# Patient Record
Sex: MALE | URBAN - METROPOLITAN AREA
[De-identification: names, ages, dates, MRNs, and addresses within clinical notes are randomized per-mention and may not be internally consistent; named-entity substitution may affect disease eponyms.]

---

## 2024-10-23 ENCOUNTER — CLAIMS CREATED FROM THE CLAIM WINDOW (OUTPATIENT)
Dept: URBAN - METROPOLITAN AREA MEDICAL CENTER 12 | Facility: MEDICAL CENTER | Age: 54
End: 2024-10-23
Payer: MEDICAID

## 2024-10-23 DIAGNOSIS — Z87.19 PERSONAL HISTORY OF OTHER DISEASES OF THE DIGESTIVE SYSTEM: ICD-10-CM

## 2024-10-23 DIAGNOSIS — K92.1 HEMATOCHEZIA: ICD-10-CM

## 2024-10-23 PROCEDURE — 99254 IP/OBS CNSLTJ NEW/EST MOD 60: CPT | Performed by: STUDENT IN AN ORGANIZED HEALTH CARE EDUCATION/TRAINING PROGRAM

## 2024-10-23 PROCEDURE — 99204 OFFICE O/P NEW MOD 45 MIN: CPT | Performed by: STUDENT IN AN ORGANIZED HEALTH CARE EDUCATION/TRAINING PROGRAM

## 2024-10-24 ENCOUNTER — CLAIMS CREATED FROM THE CLAIM WINDOW (OUTPATIENT)
Dept: URBAN - METROPOLITAN AREA MEDICAL CENTER 12 | Facility: MEDICAL CENTER | Age: 54
End: 2024-10-24
Payer: MEDICAID

## 2024-10-24 DIAGNOSIS — K62.6 RECTAL ULCER: ICD-10-CM

## 2024-10-24 DIAGNOSIS — K92.1 HEMATOCHEZIA: ICD-10-CM

## 2024-10-24 PROCEDURE — 45380 COLONOSCOPY AND BIOPSY: CPT | Performed by: INTERNAL MEDICINE

## 2024-11-19 ENCOUNTER — DASHBOARD ENCOUNTERS (OUTPATIENT)
Age: 54
End: 2024-11-19

## 2024-11-25 ENCOUNTER — OFFICE VISIT (OUTPATIENT)
Dept: URBAN - METROPOLITAN AREA CLINIC 92 | Facility: CLINIC | Age: 54
End: 2024-11-25

## 2024-11-25 NOTE — HPI-TODAY'S VISIT:
Erich Desai is a 54 y.o. 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  GI consult requested.  Patient had been reporting intermittent hematochezia over the past ~6 months. However, for the past 1 month all stools have been bloody. He has to manually stimulate his rectum in order to defecate with last BM on Friday, 10/18. He describes bright red blood that fully saturates the commode water. He does not feel anything from mid abdomen down. He denies any rectal pain or abdominal pain. He complains of weight gain. He denies any melena, chest pain, SOB, fever/chills, nausea, vomiting, change in appetite. Patient is prescribed Xarelto, but has not taken in ~6 months given bleeding.  Patient has been seen outpatient by his PCP, and recommended to have colonoscopy performed given hematochezia, however, patient did not have someone to go with him so had not been done. He has never had a colonoscopy. He takes oral iron supplementation.  History of iron deficiency anemia. Hgb 11.1 to 10.9 to 10.7.  Colonoscopy 10/24/24: Preparation of the colon was fair. Small/medium polyps could have been missed, Non-thrombosed internal hemorrhoids found on perianal exam, A single (solitary) ulcer in the rectum.The examined portion of the ileum was normal

## 2024-12-02 ENCOUNTER — OFFICE VISIT (OUTPATIENT)
Dept: URBAN - METROPOLITAN AREA CLINIC 92 | Facility: CLINIC | Age: 54
End: 2024-12-02

## 2024-12-02 NOTE — HPI-TODAY'S VISIT:
54-year-old male patient presents today for hospital follow-up.  He has a past medical history of HTN, neuropathy, spinal cord injury in 2017 with resultant paraplegia, neurogenic bladder, DVT/PEs on Xarelto, chronic osteomyelitis, recurrent/chronic right ischial pressure wound, JUDI.  He presented to [facility] on 10- due to hematochezia.  Noted he was having this for the past 6 months however past month all stool had been bloody.  He has prescribed Xarelto but has not taken this for about 6 months given the bleeding.  He had never had a colonoscopy at that time.  Colonoscopy 10- demonstrated nonthrombosed internal hemorrhoids, solitary rectal ulcer biopsy showed fibropurulent exudate consistent with ulcer no dysplasia or carcinoma prep was fair.  Labs demonstrated hemoglobin 10.9, hct 36.4.  He was stabilized and told to follow-up with GI outpatient.

## 2024-12-06 ENCOUNTER — OFFICE VISIT (OUTPATIENT)
Dept: URBAN - METROPOLITAN AREA CLINIC 92 | Facility: CLINIC | Age: 54
End: 2024-12-06
Payer: COMMERCIAL

## 2024-12-06 VITALS
HEIGHT: 76 IN | TEMPERATURE: 90.1 F | HEART RATE: 75 BPM | SYSTOLIC BLOOD PRESSURE: 143 MMHG | WEIGHT: 175.6 LBS | DIASTOLIC BLOOD PRESSURE: 82 MMHG | BODY MASS INDEX: 21.38 KG/M2

## 2024-12-06 DIAGNOSIS — K64.8 INTERNAL HEMORRHOIDS: ICD-10-CM

## 2024-12-06 DIAGNOSIS — K62.6 RECTAL ULCER: ICD-10-CM

## 2024-12-06 DIAGNOSIS — K59.09 CHRONIC CONSTIPATION: ICD-10-CM

## 2024-12-06 PROCEDURE — 99204 OFFICE O/P NEW MOD 45 MIN: CPT

## 2024-12-06 RX ORDER — RIVAROXABAN 10 MG/1
1 TABLET WITH FOOD TABLET, FILM COATED ORAL ONCE A DAY
Status: ACTIVE | COMMUNITY

## 2024-12-06 RX ORDER — CYCLOBENZAPRINE HYDROCHLORIDE 5 MG/1
1 TABLET AT BEDTIME AS NEEDED TABLET, FILM COATED ORAL ONCE A DAY
Status: ACTIVE | COMMUNITY

## 2024-12-06 RX ORDER — OLMESARTAN MEDOXOMIL 5 MG/1
AS DIRECTED TABLET, FILM COATED ORAL
Status: ACTIVE | COMMUNITY

## 2024-12-06 RX ORDER — HYDROCORTISONE ACETATE 25 MG/1
1 SUPPOSITORY SUPPOSITORY RECTAL ONCE A DAY
Qty: 14 | Refills: 3 | OUTPATIENT
Start: 2024-12-06 | End: 2025-01-31

## 2024-12-06 RX ORDER — FOLIC ACID 1 MG/1
1 TABLET TABLET ORAL ONCE A DAY
Status: ACTIVE | COMMUNITY

## 2024-12-06 RX ORDER — GABAPENTIN 300 MG/1
1 CAPSULE CAPSULE ORAL ONCE A DAY
Status: ACTIVE | COMMUNITY

## 2024-12-06 RX ORDER — AMLODIPINE BESYLATE 5 MG/1
1 TABLET TABLET ORAL ONCE A DAY
Status: ACTIVE | COMMUNITY

## 2024-12-06 NOTE — PHYSICAL EXAM CONSTITUTIONAL:
normal,  alert,  in no acute distress,  well developed, well nourished, normal communication ability, , confined to a wheelchair

## 2024-12-06 NOTE — HPI-TODAY'S VISIT:
54-year-old male patient presents today for hospital follow-up.  He has a past medical history of HTN, neuropathy, spinal cord injury in 2017 with resultant paraplegia, neurogenic bladder, DVT/PEs on Xarelto, chronic osteomyelitis, recurrent/chronic right ischial pressure wound, JUDI.  He presented to [facility] [place] on 10- due to hematochezia.  Noted he was having this for the past 6 months however past month all stool had been bloody.  He has prescribed Xarelto but has not taken this for about 6 months given the bleeding.  He had never had a colonoscopy at that time.  Colonoscopy 10- demonstrated nonthrombosed internal hemorrhoids, solitary rectal ulcer biopsy showed fibropurulent exudate consistent with ulcer no dysplasia or carcinoma prep was fair.  Labs demonstrated hemoglobin 10.9, hct 36.4.  He was stabilized and told to follow-up with GI outpatient.  Today he notes he has not been looking at his stool to see if he still has blood. He has BM every few days. He does not have rectal sensation but has rlq discomfort when he needs to have a BM. He is back on his Xarelto for blood clots. He bought fiber supplements and has used this for a few days. He has no used suppositories for his IH. He did buy a pillow to help with his IH. He has no upper gi issues. No fam h/o gi cancers or conditions.

## 2025-01-17 ENCOUNTER — OFFICE VISIT (OUTPATIENT)
Dept: URBAN - METROPOLITAN AREA CLINIC 92 | Facility: CLINIC | Age: 55
End: 2025-01-17
Payer: COMMERCIAL

## 2025-01-17 VITALS
WEIGHT: 175 LBS | BODY MASS INDEX: 21.31 KG/M2 | HEIGHT: 76 IN | SYSTOLIC BLOOD PRESSURE: 126 MMHG | TEMPERATURE: 96.4 F | DIASTOLIC BLOOD PRESSURE: 68 MMHG | HEART RATE: 98 BPM

## 2025-01-17 DIAGNOSIS — K62.6 RECTAL ULCER: ICD-10-CM

## 2025-01-17 DIAGNOSIS — K64.8 INTERNAL HEMORRHOIDS: ICD-10-CM

## 2025-01-17 DIAGNOSIS — K59.09 CHRONIC CONSTIPATION: ICD-10-CM

## 2025-01-17 PROCEDURE — 99213 OFFICE O/P EST LOW 20 MIN: CPT

## 2025-01-17 RX ORDER — HYDROCORTISONE ACETATE 25 MG/1
1 SUPPOSITORY SUPPOSITORY RECTAL ONCE A DAY
Qty: 14 | Refills: 3 | Status: ACTIVE | COMMUNITY
Start: 2024-12-06 | End: 2025-01-31

## 2025-01-17 RX ORDER — CYCLOBENZAPRINE HYDROCHLORIDE 5 MG/1
1 TABLET AT BEDTIME AS NEEDED TABLET, FILM COATED ORAL ONCE A DAY
Status: ACTIVE | COMMUNITY

## 2025-01-17 RX ORDER — AMLODIPINE BESYLATE 5 MG/1
1 TABLET TABLET ORAL ONCE A DAY
Status: ACTIVE | COMMUNITY

## 2025-01-17 RX ORDER — GABAPENTIN 300 MG/1
1 CAPSULE CAPSULE ORAL ONCE A DAY
Status: ACTIVE | COMMUNITY

## 2025-01-17 RX ORDER — RIVAROXABAN 10 MG/1
1 TABLET WITH FOOD TABLET, FILM COATED ORAL ONCE A DAY
Status: ACTIVE | COMMUNITY

## 2025-01-17 RX ORDER — FOLIC ACID 1 MG/1
1 TABLET TABLET ORAL ONCE A DAY
Status: ACTIVE | COMMUNITY

## 2025-01-17 RX ORDER — OLMESARTAN MEDOXOMIL 5 MG/1
AS DIRECTED TABLET, FILM COATED ORAL
Status: ACTIVE | COMMUNITY

## 2025-01-17 NOTE — HPI-TODAY'S VISIT:
12/6/24 54-year-old male patient presents today for hospital follow-up.  He has a past medical history of HTN, neuropathy, spinal cord injury in 2017 with resultant paraplegia, neurogenic bladder, DVT/PEs on Xarelto, chronic osteomyelitis, recurrent/chronic right ischial pressure wound, JUDI.  He presented to [facility] on 10- due to hematochezia.  Noted he was having this for the past 6 months however past month all stool had been bloody.  He has prescribed Xarelto but has not taken this for about 6 months given the bleeding.  He had never had a colonoscopy at that time.  Colonoscopy 10- demonstrated nonthrombosed internal hemorrhoids, solitary rectal ulcer biopsy showed fibropurulent exudate consistent with ulcer no dysplasia or carcinoma prep was fair.  Labs demonstrated hemoglobin 10.9, hct 36.4.  He was stabilized and told to follow-up with GI outpatient.  Today he notes he has not been looking at his stool to see if he still has blood. He has BM every few days. He does not have rectal sensation but has rlq discomfort when he needs to have a BM. He is back on his Xarelto for blood clots. He bought fiber supplements and has used this for a few days. He has no used suppositories for his IH. He did buy a pillow to help with his IH. He has no upper gi issues. No fam h/o gi cancers or conditions.  1/17/25 He has been doing fiber daily and this helps his BM. He picked up prep h cream not suppositories. Still has rectal bleeding daily. No labs since oct 2024

## 2025-01-18 LAB
ABSOLUTE BASOPHILS: 52
ABSOLUTE EOSINOPHILS: 124
ABSOLUTE LYMPHOCYTES: 1566
ABSOLUTE MONOCYTES: 855
ABSOLUTE NEUTROPHILS: 7704
BASOPHILS: 0.5
EOSINOPHILS: 1.2
HEMATOCRIT: 34.2
HEMOGLOBIN: 10.8
LYMPHOCYTES: 15.2
MCH: 24.9
MCHC: 31.6
MCV: 78.8
MONOCYTES: 8.3
MPV: 9.3
NEUTROPHILS: 74.8
PLATELET COUNT: 614
RDW: 13.5
RED BLOOD CELL COUNT: 4.34
WHITE BLOOD CELL COUNT: 10.3

## 2025-04-03 ENCOUNTER — CLAIMS CREATED FROM THE CLAIM WINDOW (OUTPATIENT)
Dept: URBAN - METROPOLITAN AREA MEDICAL CENTER 12 | Facility: MEDICAL CENTER | Age: 55
End: 2025-04-03
Payer: COMMERCIAL

## 2025-04-03 DIAGNOSIS — K59.09 OTHER CONSTIPATION: ICD-10-CM

## 2025-04-03 DIAGNOSIS — K92.1 HEMATOCHEZIA: ICD-10-CM

## 2025-04-03 DIAGNOSIS — Z87.19 PERSONAL HISTORY OF OTHER DISEASES OF THE DIGESTIVE SYSTEM: ICD-10-CM

## 2025-04-03 DIAGNOSIS — D64.89 ANEMIA DUE TO OTHER CAUSE: ICD-10-CM

## 2025-04-03 PROCEDURE — G8427 DOCREV CUR MEDS BY ELIG CLIN: HCPCS | Performed by: STUDENT IN AN ORGANIZED HEALTH CARE EDUCATION/TRAINING PROGRAM

## 2025-04-03 PROCEDURE — 99253 IP/OBS CNSLTJ NEW/EST LOW 45: CPT | Performed by: STUDENT IN AN ORGANIZED HEALTH CARE EDUCATION/TRAINING PROGRAM

## 2025-04-03 PROCEDURE — 99221 1ST HOSP IP/OBS SF/LOW 40: CPT | Performed by: STUDENT IN AN ORGANIZED HEALTH CARE EDUCATION/TRAINING PROGRAM

## 2025-04-04 ENCOUNTER — CLAIMS CREATED FROM THE CLAIM WINDOW (OUTPATIENT)
Dept: URBAN - METROPOLITAN AREA MEDICAL CENTER 12 | Facility: MEDICAL CENTER | Age: 55
End: 2025-04-04
Payer: COMMERCIAL

## 2025-04-04 DIAGNOSIS — K62.5 ANAL BLEEDING: ICD-10-CM

## 2025-04-04 DIAGNOSIS — K59.09 OTHER CONSTIPATION: ICD-10-CM

## 2025-04-04 DIAGNOSIS — D64.89 ANEMIA DUE TO OTHER CAUSE: ICD-10-CM

## 2025-04-04 PROCEDURE — 99232 SBSQ HOSP IP/OBS MODERATE 35: CPT | Performed by: STUDENT IN AN ORGANIZED HEALTH CARE EDUCATION/TRAINING PROGRAM
